# Patient Record
Sex: FEMALE | Race: WHITE | NOT HISPANIC OR LATINO | Employment: UNEMPLOYED | URBAN - METROPOLITAN AREA
[De-identification: names, ages, dates, MRNs, and addresses within clinical notes are randomized per-mention and may not be internally consistent; named-entity substitution may affect disease eponyms.]

---

## 2022-09-09 ENCOUNTER — TELEPHONE (OUTPATIENT)
Dept: OBGYN CLINIC | Facility: CLINIC | Age: 62
End: 2022-09-09

## 2022-10-03 NOTE — PATIENT INSTRUCTIONS
- Maintain healthy weight with BMI ideally between 18-25     - Eat a healthy diet, including multiple servings of vegetables and fruits, as well as lean protein sources  - Get at least 150 minutes of moderate aerobic activity or 75 minutes of vigorous aerobic activity a week, or a combination of moderate and vigorous activity  Greater amounts of exercise will provide an even greater health benefit  - Ensure diet provides 1200mg of Calcium daily (divided) and 800IU of vitamin D, or take supplements to meet this  - Safe sex practices recommended  - Resources - information on birth control and sexually transmitted infections - www bedsider  org            - information on sexually transmitted infections - www cdc gov/std/     Strong Bones at Every Age    What is Calcium and What Does it Do? Calcium is a mineral that is necessary for life  In addition to building bones and keeping them healthy, calcium helps our blood clot, nerves send messages and muscles contract  About 99 percent of the calcium in our bodies is in our bones and teeth  Each day, we lose calcium through our skin, nails, hair, sweat, urine and feces, but our bodies cannot produce new calcium  That's why it's important to try to get calcium from the food we eat  When we don't get enough calcium for our body's needs, it is taken from our bones  Daily Recommendations for Ca and Vit D (FDA, IOM)   Age Calcium Vitamin D   Teen - 52yo 1,000mg 600IU   50yo and older 1,200mg 800IU   Maximum Daily 2,000-2,500mg 4,000IU     Calcium should be spread out throughout the day for better absorption, ~500mg at a time  Find a Calcium Calculator @ www  osteoporosis  foundation/educational-hub/topic/calcium-calculator    Vitamin D: Vitamin D is in fish oils, some vegetables, and fortified milk, cereal, and bread  Vitamin D is also formed in the skin when it is exposed to the sun       Dietary Calcium Amounts  Food Amount     Calcium (mg)      Milk (skim, low fat, whole) 1 cup 300       Cottage Cheese 1/2 cup 65   Ice Cream or Ice Milk 1/2 cup 100   Sour Cream, cultured 1 cup 250   Soy Milk, calcium fortified 1 cup 200 to 400   Yogurt 1 cup 450   Yogurt drink 12 oz 300   Nonfat dry milk powder 5 Tbsp 300   Brie Cheese 1 oz 50   Hard Cheese (cheddar, gael)     1 oz 200   Mozzarella 1 oz 200   Parmesan Cheese 1 Tbsp 70   Broccoli, cooked 1 cup 180   Kale, raw 1 cup 55   Orange Juice, Ca++ fortified 1 cup 300   Spinach, cooked 1 cup 240

## 2022-10-04 ENCOUNTER — OFFICE VISIT (OUTPATIENT)
Dept: OBGYN CLINIC | Facility: CLINIC | Age: 62
End: 2022-10-04

## 2022-10-04 VITALS
BODY MASS INDEX: 22.98 KG/M2 | DIASTOLIC BLOOD PRESSURE: 72 MMHG | SYSTOLIC BLOOD PRESSURE: 122 MMHG | WEIGHT: 143 LBS | HEIGHT: 66 IN

## 2022-10-04 DIAGNOSIS — Z01.419 ENCOUNTER FOR ANNUAL ROUTINE GYNECOLOGICAL EXAMINATION: Primary | ICD-10-CM

## 2022-10-04 DIAGNOSIS — Z80.3 FAMILY HISTORY OF BREAST CANCER IN SISTER: ICD-10-CM

## 2022-10-04 DIAGNOSIS — Z11.3 ROUTINE SCREENING FOR STI (SEXUALLY TRANSMITTED INFECTION): ICD-10-CM

## 2022-10-04 DIAGNOSIS — Z12.4 CERVICAL CANCER SCREENING: ICD-10-CM

## 2022-10-04 DIAGNOSIS — N89.8 DISCHARGE OF VAGINA: ICD-10-CM

## 2022-10-04 DIAGNOSIS — N95.0 POSTMENOPAUSAL BLEEDING: ICD-10-CM

## 2022-10-04 DIAGNOSIS — Z12.31 ENCOUNTER FOR SCREENING MAMMOGRAM FOR MALIGNANT NEOPLASM OF BREAST: ICD-10-CM

## 2022-10-04 LAB
BV WHIFF TEST VAG QL: NORMAL
CLUE CELLS SPEC QL WET PREP: NORMAL
PH SMN: 5.5 [PH]
T VAGINALIS VAG QL WET PREP: NORMAL
YEAST VAG QL WET PREP: NORMAL

## 2022-10-04 NOTE — PROGRESS NOTES
Annual Wellness Visit  [address], [address], [facility], [address] [facility]    ASSESSMENT/PLAN: Shena Gardner is a 63 y o   who presents for annual gynecologic exam     Encounter for routine gynecologic examination  - Routine well woman exam completed today  - Cervical Cancer Screening: Current ASCCP Guidelines reviewed  Last Pap: ~2018 normal per pt  Next Pap Due: today, routine   - Contraceptive counseling discussed  Current contraception: postmenopausal  - Breast Cancer Screening: Last Mammogram ~ per pt normal  - Colorectal cancer screening last Cologuard  per pt normal, next due   - The following were reviewed in today's visit: mammography screening ordered, STD testing, menopause, adequate intake of calcium and vitamin D, exercise and healthy diet    Additional problems addressed during this visit:  1  Encounter for annual routine gynecological examination    2  Encounter for screening mammogram for malignant neoplasm of breast  -     Mammo screening bilateral w 3d & cad; Future    3  Cervical cancer screening  -     IGP,CtNg,AptimaHPV,rfx16/18,45    4  Routine screening for STI (sexually transmitted infection)  Comments:  Requested by patient  Encouraged to check coverage for blood work  Orders:  -     IGP,CtNg,AptimaHPV,rfx16/18,45  -     Hepatitis B surface antigen; Future  -     Hepatitis C antibody; Future  -     Human Immunodeficiency Virus 1/2 Antigen / Antibody ( Fourth Generation) with Reflex Testing; Future  -     RPR, Rfx Qn RPR/Confirm TP; Future  -     Hepatitis B surface antigen  -     Hepatitis C antibody  -     Human Immunodeficiency Virus 1/2 Antigen / Antibody ( Fourth Generation) with Reflex Testing  -     RPR, Rfx Qn RPR/Confirm TP    5  Postmenopausal bleeding  Assessment & Plan:  Periods stopped about 6 years ago (~55yo)  But states about twice a year has had spotting once with wiping and then go away "once and done"      10/4/2022 - Over this past weekend had mucus discharge with some blood on underwear as well as wiping, which continued for a few hours  This is different than previous spotting episodes over the last 5 years  At the same time had some low abdominal cramping  Resolved by next day  Patient is not currently sexually active  Exam normal except friable cervix with pap  Will order pelvic u/s to evaluate endometrial lining  Orders:  -     US pelvis complete w transvaginal; Future    6  Family history of breast cancer in sister  Assessment & Plan:  Continue annual breast exams and mammogram screening      7  Discharge of vagina  Comments:  occasional yellow tinge to discharge  Exam and wet mount normal   GC/CT testing ordered  Reassurance likely physiologic discharge  Orders:  -     POCT wet mount      Next visit: 1 year Wellness      CC:  Annual Gynecologic Examination    HPI: Susana Nielson is a 63 y o   who presents for annual gynecologic examination  Syeda Agarwal presents as a New patient today for GYN Wellness exam   She also wishes STI testing and had some spotting over the weekend  Last GYN 5 years ago  Periods stopped about 6 years ago  But states about twice a year has had spotting once with wiping and then go away "once and done"  Over this past weekend had mucus discharge with some blood on underwear as well as wiping, which continued for a few hours  This is different than previous spotting episodes over the last 5 years  At the same time had some low abdominal cramping  Resolved by next day  Not currently sexually active  Last in April or May 2022  Wishes STI testing  Feels discharge since started being a bit yellow tinged when became sexually active in 2022  No longer sexually active but still discharge a bit yellow tinged  Pap smear last couple years ago - no abnormal    Mammogram 2 years ago was normal   Colonscopy - Cologuard  normal per pt            Gyn History  Patient's last menstrual period was 2017  Last Pap: Not on file pt states normal around 2018    She  reports previously being sexually active  OB History      Past Medical History:  No date: Anxiety  No date: Mitral valve prolapse      Comment:  seeing cardiology at end of October     Past Surgical History:  : APPENDECTOMY  : ULNAR NERVE REPAIR     Family History   Problem Relation Age of Onset    Stroke Mother     Lung cancer Father     Breast cancer Sister 47        Sister had genetic testing and told no genetic mutations    Diabetes Maternal Grandmother     Hashimoto's thyroiditis Daughter     Diabetes Maternal Aunt     Breast cancer Maternal Aunt         4 Maternal Aunts with breast cancer    Diabetes Maternal Uncle     Colon cancer Neg Hx         Social History     Tobacco Use    Smoking status: Never Smoker    Smokeless tobacco: Never Used   Vaping Use    Vaping Use: Never used   Substance Use Topics    Alcohol use: Not Currently     Alcohol/week: 4 0 standard drinks     Types: 4 Shots of liquor per week    Drug use: No          Current Outpatient Medications:     LORazepam (ATIVAN) 0 5 mg tablet, Take 0 5 mg by mouth every 6 (six) hours as needed, Disp: , Rfl:     She is allergic to naproxen, prozac [fluoxetine], aspirin, codeine, nuts - food allergy, penicillins, shellfish-derived products - food allergy, and sulfa antibiotics       ROS negative except as noted in HPI    Objective:  /72 (BP Location: Right arm, Patient Position: Sitting, Cuff Size: Standard)   Ht 5' 6" (1 676 m)   Wt 64 9 kg (143 lb)   LMP 2017   BMI 23 08 kg/m²      Physical Exam  Constitutional:       Appearance: Normal appearance  Chest:   Breasts:      Right: Normal  No mass, tenderness or axillary adenopathy  Left: Normal  No mass, tenderness or axillary adenopathy  Abdominal:      Palpations: Abdomen is soft  Tenderness: There is no abdominal tenderness  Genitourinary:     General: Normal vulva  Vagina: No bleeding or lesions  Cervix: Friability (small bleeding during pap) present  No lesion  Uterus: Normal  Not tender  Adnexa:         Right: No mass or tenderness  Left: No mass or tenderness  Rectum: No mass or external hemorrhoid  Normal anal tone  Comments: Atrophic changes appropriate to age  Musculoskeletal:         General: Normal range of motion  Lymphadenopathy:      Upper Body:      Right upper body: No axillary adenopathy  Left upper body: No axillary adenopathy  Neurological:      Mental Status: She is alert and oriented to person, place, and time     Psychiatric:         Mood and Affect: Mood normal          Behavior: Behavior normal

## 2022-10-04 NOTE — ASSESSMENT & PLAN NOTE
Periods stopped about 6 years ago (~57yo)  But states about twice a year has had spotting once with wiping and then go away "once and done"  10/4/2022 - Over this past weekend had mucus discharge with some blood on underwear as well as wiping, which continued for a few hours  This is different than previous spotting episodes over the last 5 years  At the same time had some low abdominal cramping  Resolved by next day  Patient is not currently sexually active  Exam normal except friable cervix with pap  Will order pelvic u/s to evaluate endometrial lining

## 2022-10-07 LAB
C TRACH RRNA CVX QL NAA+PROBE: NEGATIVE
CYTOLOGIST CVX/VAG CYTO: NORMAL
DX ICD CODE: NORMAL
HPV I/H RISK 4 DNA CVX QL PROBE+SIG AMP: NEGATIVE
N GONORRHOEA RRNA CVX QL NAA+PROBE: NEGATIVE
OTHER STN SPEC: NORMAL
PATH REPORT.FINAL DX SPEC: NORMAL
SL AMB NOTE:: NORMAL
SL AMB SPECIMEN ADEQUACY: NORMAL
SL AMB TEST METHODOLOGY: NORMAL

## 2022-10-21 ENCOUNTER — TELEPHONE (OUTPATIENT)
Dept: OBGYN CLINIC | Facility: CLINIC | Age: 62
End: 2022-10-21

## 2022-10-21 NOTE — TELEPHONE ENCOUNTER
Samuel Butts called into nurse's line requesting pap and GC/C results, reviewed negative results and routine follow up  Patient verbalizes understanding